# Patient Record
Sex: FEMALE | Race: BLACK OR AFRICAN AMERICAN
[De-identification: names, ages, dates, MRNs, and addresses within clinical notes are randomized per-mention and may not be internally consistent; named-entity substitution may affect disease eponyms.]

---

## 2019-12-05 ENCOUNTER — HOSPITAL ENCOUNTER (OUTPATIENT)
Dept: HOSPITAL 74 - JASU-ENDO | Age: 64
Discharge: HOME | End: 2019-12-05
Attending: INTERNAL MEDICINE
Payer: COMMERCIAL

## 2019-12-05 VITALS — HEART RATE: 65 BPM | SYSTOLIC BLOOD PRESSURE: 109 MMHG | DIASTOLIC BLOOD PRESSURE: 63 MMHG

## 2019-12-05 VITALS — BODY MASS INDEX: 25.9 KG/M2

## 2019-12-05 VITALS — TEMPERATURE: 98.2 F

## 2019-12-05 DIAGNOSIS — K57.30: ICD-10-CM

## 2019-12-05 DIAGNOSIS — K64.8: ICD-10-CM

## 2019-12-05 DIAGNOSIS — D12.3: ICD-10-CM

## 2019-12-05 DIAGNOSIS — K63.5: ICD-10-CM

## 2019-12-05 DIAGNOSIS — K62.1: ICD-10-CM

## 2019-12-05 DIAGNOSIS — Z12.11: Primary | ICD-10-CM

## 2019-12-05 PROCEDURE — 0DBP8ZX EXCISION OF RECTUM, VIA NATURAL OR ARTIFICIAL OPENING ENDOSCOPIC, DIAGNOSTIC: ICD-10-PCS | Performed by: INTERNAL MEDICINE

## 2019-12-05 PROCEDURE — 0DBL8ZX EXCISION OF TRANSVERSE COLON, VIA NATURAL OR ARTIFICIAL OPENING ENDOSCOPIC, DIAGNOSTIC: ICD-10-PCS | Performed by: INTERNAL MEDICINE

## 2019-12-06 NOTE — PATH
Surgical Pathology Report



Patient Name:  JUMA MATIAS

Accession #:  [id number]

[facility]. Rec. #:  [id number]                                                        

   /Age/Gender:  1955 (Age: 64) / F

Account:  [id number]                                                          

             Location: ASU-ENDOSCOPY

Taken:  2019

Received:  2019

Reported:  2019

Physicians:  Adams Sams D.O.

  



Specimen(s) Received

A: TRANSVERSE COLON, POLYP 

B: RECTAL POLYP 





Clinical History

History of colon polyps

Postoperative diagnosis: Diverticulosis, colon polyp







Final Diagnosis

A. TRANSVERSE COLON, POLYP, BIOPSY:

POLYPOID COLONIC MUCOSA WITHOUT SIGNIFICANT PATHOLOGIC FINDINGS.



B. RECTAL POLYP, BIOPSY:

HYPERPLASTIC POLYP.







***Electronically Signed***

Laverne Mtz M.D.





Gross Description

A.  Received in formalin, labeled "biopsy transverse colon polyp" is a tan,

irregular portion of soft tissue measuring 0.4 cm. in greatest dimension. The

specimen is submitted in toto in one cassette.



B.  Received in formalin, labeled "biopsy rectal polyp" is a tan, irregular

portion of soft tissue measuring 0.4 cm. in greatest dimension. The specimen is

submitted in toto in one cassette.

DL/2019



saudi

## 2022-12-23 ENCOUNTER — OFFICE (OUTPATIENT)
Dept: URBAN - METROPOLITAN AREA CLINIC 121 | Facility: CLINIC | Age: 67
Setting detail: OPHTHALMOLOGY
End: 2022-12-23
Payer: MEDICARE

## 2022-12-23 DIAGNOSIS — H40.013: ICD-10-CM

## 2022-12-23 PROCEDURE — 99212 OFFICE O/P EST SF 10 MIN: CPT | Performed by: OPHTHALMOLOGY

## 2022-12-23 PROCEDURE — 92133 CPTRZD OPH DX IMG PST SGM ON: CPT | Performed by: OPHTHALMOLOGY

## 2022-12-23 PROCEDURE — 92083 EXTENDED VISUAL FIELD XM: CPT | Performed by: OPHTHALMOLOGY

## 2022-12-23 ASSESSMENT — KERATOMETRY
OD_K2POWER_DIOPTERS: 44.50
METHOD_AUTO_MANUAL: AUTO
OS_K2POWER_DIOPTERS: 45.25
OS_AXISANGLE_DEGREES: 111
OD_AXISANGLE_DEGREES: 006
OD_K1POWER_DIOPTERS: 44.25
OS_K1POWER_DIOPTERS: 44.75

## 2022-12-23 ASSESSMENT — REFRACTION_AUTOREFRACTION
OS_CYLINDER: +0.50
OD_SPHERE: -1.00
OS_AXIS: 155
OS_SPHERE: -1.00
OD_CYLINDER: +1.00
OD_AXIS: 180

## 2022-12-23 ASSESSMENT — AXIALLENGTH_DERIVED
OD_AL: 23.466
OS_AL: 23.336

## 2022-12-23 ASSESSMENT — SPHEQUIV_DERIVED
OD_SPHEQUIV: -0.5
OS_SPHEQUIV: -0.75

## 2022-12-23 ASSESSMENT — VISUAL ACUITY
OS_BCVA: 20/25
OD_BCVA: 20/40

## 2022-12-23 ASSESSMENT — CONFRONTATIONAL VISUAL FIELD TEST (CVF)
OD_FINDINGS: FULL
OS_FINDINGS: FULL

## 2023-06-23 ENCOUNTER — OFFICE (OUTPATIENT)
Dept: URBAN - METROPOLITAN AREA CLINIC 121 | Facility: CLINIC | Age: 68
Setting detail: OPHTHALMOLOGY
End: 2023-06-23
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
## 2023-06-23 PROCEDURE — NO SHOW FE NO SHOW FEE: Performed by: OPHTHALMOLOGY

## 2023-07-31 ENCOUNTER — OFFICE (OUTPATIENT)
Dept: URBAN - METROPOLITAN AREA CLINIC 121 | Facility: CLINIC | Age: 68
Setting detail: OPHTHALMOLOGY
End: 2023-07-31
Payer: MEDICARE

## 2023-07-31 DIAGNOSIS — Z96.1: ICD-10-CM

## 2023-07-31 DIAGNOSIS — E11.9: ICD-10-CM

## 2023-07-31 DIAGNOSIS — H40.023: ICD-10-CM

## 2023-07-31 DIAGNOSIS — H35.3131: ICD-10-CM

## 2023-07-31 PROCEDURE — 92014 COMPRE OPH EXAM EST PT 1/>: CPT | Performed by: OPHTHALMOLOGY

## 2023-07-31 PROCEDURE — 92250 FUNDUS PHOTOGRAPHY W/I&R: CPT | Performed by: OPHTHALMOLOGY

## 2023-07-31 ASSESSMENT — CONFRONTATIONAL VISUAL FIELD TEST (CVF)
OD_FINDINGS: FULL
OS_FINDINGS: FULL

## 2023-07-31 ASSESSMENT — TONOMETRY
OS_IOP_MMHG: 18
OD_IOP_MMHG: 18

## 2023-07-31 ASSESSMENT — REFRACTION_MANIFEST
OS_SPHERE: -1.00
OD_CYLINDER: +0.50
OS_CYLINDER: +0.50
OD_AXIS: 180
OS_AXIS: 180
OD_SPHERE: -1.00

## 2023-07-31 ASSESSMENT — SPHEQUIV_DERIVED
OS_SPHEQUIV: -0.75
OS_SPHEQUIV: -0.625
OD_SPHEQUIV: -0.75
OD_SPHEQUIV: -0.625

## 2023-07-31 ASSESSMENT — PACHYMETRY
OD_CT_UM: 549
OD_CT_CORRECTION: 0
OS_CT_UM: 546
OS_CT_CORRECTION: 0

## 2023-07-31 ASSESSMENT — REFRACTION_AUTOREFRACTION
OD_AXIS: 008
OD_CYLINDER: +0.75
OD_SPHERE: -1.00
OS_SPHERE: -1.00
OS_AXIS: 179
OS_CYLINDER: +0.75

## 2023-07-31 ASSESSMENT — VISUAL ACUITY
OS_BCVA: 20/30
OD_BCVA: 20/60

## 2023-12-20 ENCOUNTER — OFFICE (OUTPATIENT)
Dept: URBAN - METROPOLITAN AREA CLINIC 121 | Facility: CLINIC | Age: 68
Setting detail: OPHTHALMOLOGY
End: 2023-12-20
Payer: MEDICARE

## 2023-12-20 DIAGNOSIS — H40.023: ICD-10-CM

## 2023-12-20 PROCEDURE — 92133 CPTRZD OPH DX IMG PST SGM ON: CPT | Performed by: OPHTHALMOLOGY

## 2023-12-20 PROCEDURE — 92083 EXTENDED VISUAL FIELD XM: CPT | Performed by: OPHTHALMOLOGY

## 2023-12-20 PROCEDURE — 99212 OFFICE O/P EST SF 10 MIN: CPT | Performed by: OPHTHALMOLOGY

## 2023-12-20 ASSESSMENT — SPHEQUIV_DERIVED
OD_SPHEQUIV: -0.75
OS_SPHEQUIV: -0.625
OD_SPHEQUIV: -0.625
OS_SPHEQUIV: -0.75

## 2023-12-20 ASSESSMENT — REFRACTION_AUTOREFRACTION
OD_AXIS: 008
OS_AXIS: 179
OS_SPHERE: -1.00
OS_CYLINDER: +0.75
OD_SPHERE: -1.00
OD_CYLINDER: +0.75

## 2023-12-20 ASSESSMENT — REFRACTION_MANIFEST
OD_AXIS: 180
OS_CYLINDER: +0.50
OD_SPHERE: -1.00
OS_SPHERE: -1.00
OD_CYLINDER: +0.50
OS_AXIS: 180

## 2023-12-20 ASSESSMENT — CONFRONTATIONAL VISUAL FIELD TEST (CVF)
OD_FINDINGS: FULL
OS_FINDINGS: FULL